# Patient Record
Sex: MALE | Race: WHITE | NOT HISPANIC OR LATINO | URBAN - NONMETROPOLITAN AREA
[De-identification: names, ages, dates, MRNs, and addresses within clinical notes are randomized per-mention and may not be internally consistent; named-entity substitution may affect disease eponyms.]

---

## 2018-11-21 ENCOUNTER — OFFICE VISIT (OUTPATIENT)
Dept: INTERNAL MEDICINE | Facility: CLINIC | Age: 23
End: 2018-11-21

## 2018-11-21 VITALS
SYSTOLIC BLOOD PRESSURE: 120 MMHG | WEIGHT: 215 LBS | DIASTOLIC BLOOD PRESSURE: 74 MMHG | HEIGHT: 74 IN | HEART RATE: 58 BPM | RESPIRATION RATE: 16 BRPM | BODY MASS INDEX: 27.59 KG/M2 | OXYGEN SATURATION: 100 % | TEMPERATURE: 97.2 F

## 2018-11-21 DIAGNOSIS — Z23 NEED FOR HEPATITIS A IMMUNIZATION: ICD-10-CM

## 2018-11-21 DIAGNOSIS — Z00.00 ENCOUNTER FOR ANNUAL PHYSICAL EXAM: Primary | ICD-10-CM

## 2018-11-21 DIAGNOSIS — Z83.3 FAMILY HISTORY OF TYPE II DIABETES MELLITUS: ICD-10-CM

## 2018-11-21 DIAGNOSIS — Z82.49 FAMILY HISTORY OF HYPERTENSION: ICD-10-CM

## 2018-11-21 PROCEDURE — 90471 IMMUNIZATION ADMIN: CPT | Performed by: PHYSICIAN ASSISTANT

## 2018-11-21 PROCEDURE — 99395 PREV VISIT EST AGE 18-39: CPT | Performed by: PHYSICIAN ASSISTANT

## 2018-11-21 PROCEDURE — 90632 HEPA VACCINE ADULT IM: CPT | Performed by: PHYSICIAN ASSISTANT

## 2018-11-21 NOTE — PROGRESS NOTES
Chief Complaint   Patient presents with   • Establish Care   • PHYSICAL       Walter Justin is a 23 y.o. male and is here for a comprehensive physical exam.  The patient reports no problems.  He reports that he needs a physical exam so that he can be fit tested for a respirator.  He is attending a restoration class next week, which requires fit testing.      Do you take any herbs or supplements that were not prescribed by a doctor? no  Are you taking calcium supplements? no  Are you taking aspirin daily? no    Health Habits:  Dental Exam. up to date  Eye Exam. up to date  Exercise: 3 times/week.  Current exercise activities include: walking and weightlifting    Health Maintenance   Topic Date Due   • ANNUAL PHYSICAL  11/22/2019   • TDAP/TD VACCINES (2 - Td) 07/16/2028   • INFLUENZA VACCINE  Completed       PMH, PSH, SocHx, FamHx, Allergies, and Medications: Reviewed and updated in the Visit Navigator.     No Known Allergies  Past Medical History:   Diagnosis Date   • GERD (gastroesophageal reflux disease)      Past Surgical History:   Procedure Laterality Date   • TONSILLECTOMY     • WISDOM TOOTH EXTRACTION       Social History     Socioeconomic History   • Marital status:      Spouse name: Not on file   • Number of children: Not on file   • Years of education: Not on file   • Highest education level: Not on file   Social Needs   • Financial resource strain: Not on file   • Food insecurity - worry: Not on file   • Food insecurity - inability: Not on file   • Transportation needs - medical: Not on file   • Transportation needs - non-medical: Not on file   Occupational History   • Not on file   Tobacco Use   • Smoking status: Never Smoker   • Smokeless tobacco: Never Used   Substance and Sexual Activity   • Alcohol use: Yes     Alcohol/week: 0.6 oz     Types: 1 Cans of beer per week   • Drug use: No   • Sexual activity: Yes     Partners: Female   Other Topics Concern   • Not on file   Social History  "Narrative   • Not on file     Family History   Problem Relation Age of Onset   • Arthritis Mother    • Anxiety disorder Mother    • Diabetes Mother    • Hypertension Mother    • Diabetes Brother        Review of Systems  Review of Systems   Constitutional: Negative for activity change, appetite change, fatigue, fever, unexpected weight gain and unexpected weight loss.   HENT: Negative for congestion and sore throat.    Eyes: Negative for visual disturbance.   Respiratory: Negative for shortness of breath.    Cardiovascular: Negative for chest pain.   Gastrointestinal: Negative for abdominal pain, diarrhea, nausea and vomiting.   Genitourinary: Negative for dysuria.   Musculoskeletal: Negative for arthralgias and myalgias.   Neurological: Negative for syncope, light-headedness and confusion.   Psychiatric/Behavioral: Negative for behavioral problems, sleep disturbance and depressed mood.        Vitals:    11/21/18 1001   BP: 120/74   Pulse: 58   Resp: 16   Temp: 97.2 °F (36.2 °C)   SpO2: 100%       Objective   /74 (BP Location: Left arm, Patient Position: Sitting, Cuff Size: Adult)   Pulse 58   Temp 97.2 °F (36.2 °C)   Resp 16   Ht 188 cm (74\")   Wt 97.5 kg (215 lb)   SpO2 100%   BMI 27.60 kg/m²     Physical Exam   Constitutional: He is oriented to person, place, and time. He appears well-developed and well-nourished. No distress.   HENT:   Head: Normocephalic and atraumatic.   Right Ear: Hearing, tympanic membrane, external ear and ear canal normal.   Left Ear: Hearing, tympanic membrane, external ear and ear canal normal.   Nose: No rhinorrhea or congestion.   Mouth/Throat: Oropharynx is clear and moist.   Neck: Neck supple.   Cardiovascular: Normal rate, regular rhythm, normal heart sounds and intact distal pulses. Exam reveals no gallop and no friction rub.   No murmur heard.  Pulmonary/Chest: Effort normal and breath sounds normal. No respiratory distress. He has no wheezes. He has no rales. "   Abdominal: Soft. Bowel sounds are normal.   Musculoskeletal: Normal range of motion. He exhibits no edema.   Neurological: He is alert and oriented to person, place, and time.   Skin: Skin is warm and dry. Capillary refill takes less than 2 seconds. He is not diaphoretic.   Psychiatric: He has a normal mood and affect. His behavior is normal.   Nursing note and vitals reviewed.        Assessment/Plan   1. Healthy male exam.  Letter provided for him to proceed with FIT testing.   2. Patient Counseling: Including but not Limited to the following, when appropriate:  --Nutrition: Stressed importance of moderation in sodium/caffeine intake, saturated fat and cholesterol, caloric balance, sufficient intake of fresh fruits, vegetables, fiber, calcium, iron.  Encouraged patient to incorporate fresh fruits and vegetables into his diet.  --Exercise: Stressed the importance of regular exercise.   --Substance Abuse: Discussed cessation/primary prevention of tobacco, alcohol, or other drug use; driving or other dangerous activities under the influence; availability of treatment for abuse, as indicated based on social history.  Denies any drug use. Does not smoke.   --Sexuality: Discussed sexually transmitted diseases, partner selection, use of condoms, avoidance of unintended pregnancy  and contraceptive alternatives.   --Injury prevention: Discussed safety belts, safety helmets, smoke detector, smoking near bedding or upholstery.   --Dental health: Discussed importance of regular tooth brushing, flossing, and dental visits.  --Immunizations reviewed.  Hepatitis A vaccination given today in clinic.  3. Discussed the patient's BMI with him.  Patient's Body mass index is 27.6 kg/m². BMI is within normal parameters. No follow-up required.  4. Return for Annual.   5. Reviewed Immunization history.  Will obtain hepatitis A vaccination today.     Walter was seen today for establish care and physical.    Diagnoses and all orders  for this visit:    Encounter for annual physical exam  -     CBC & Differential  -     Comprehensive Metabolic Panel    Family history of type II diabetes mellitus  -     CBC & Differential  -     Comprehensive Metabolic Panel    Family history of hypertension  -     CBC & Differential  -     Comprehensive Metabolic Panel    Need for hepatitis A immunization  -     Hepatitis A Vaccine Adult IM         ALEX MoralesC

## 2019-09-11 RX ORDER — AMOXICILLIN 875 MG/1
875 TABLET, COATED ORAL 2 TIMES DAILY
Qty: 20 TABLET | Refills: 0 | Status: SHIPPED | OUTPATIENT
Start: 2019-09-11 | End: 2019-09-21

## 2019-11-08 ENCOUNTER — FLU SHOT (OUTPATIENT)
Dept: INTERNAL MEDICINE | Facility: CLINIC | Age: 24
End: 2019-11-08

## 2019-11-08 DIAGNOSIS — Z23 NEED FOR HEPATITIS A IMMUNIZATION: ICD-10-CM

## 2019-11-08 DIAGNOSIS — Z23 NEED FOR INFLUENZA VACCINATION: Primary | ICD-10-CM

## 2019-11-08 PROCEDURE — 90674 CCIIV4 VAC NO PRSV 0.5 ML IM: CPT | Performed by: FAMILY MEDICINE

## 2019-11-08 PROCEDURE — 90632 HEPA VACCINE ADULT IM: CPT | Performed by: FAMILY MEDICINE

## 2019-11-08 PROCEDURE — 90472 IMMUNIZATION ADMIN EACH ADD: CPT | Performed by: FAMILY MEDICINE

## 2019-11-08 PROCEDURE — 90471 IMMUNIZATION ADMIN: CPT | Performed by: FAMILY MEDICINE

## 2020-12-08 ENCOUNTER — OFFICE VISIT (OUTPATIENT)
Dept: INTERNAL MEDICINE | Facility: CLINIC | Age: 25
End: 2020-12-08

## 2020-12-08 ENCOUNTER — CLINICAL SUPPORT (OUTPATIENT)
Dept: INTERNAL MEDICINE | Facility: CLINIC | Age: 25
End: 2020-12-08

## 2020-12-08 DIAGNOSIS — R68.89 FLU-LIKE SYMPTOMS: ICD-10-CM

## 2020-12-08 DIAGNOSIS — R51.9 ACUTE NONINTRACTABLE HEADACHE, UNSPECIFIED HEADACHE TYPE: ICD-10-CM

## 2020-12-08 DIAGNOSIS — R23.3 PETECHIAE OF PALATE: ICD-10-CM

## 2020-12-08 DIAGNOSIS — R50.9 FEVER AND CHILLS: Primary | ICD-10-CM

## 2020-12-08 DIAGNOSIS — R52 BODY ACHES: ICD-10-CM

## 2020-12-08 LAB
EXPIRATION DATE: NORMAL
EXPIRATION DATE: NORMAL
FLUAV AG NPH QL: NEGATIVE
FLUBV AG NPH QL: NEGATIVE
INTERNAL CONTROL: NORMAL
INTERNAL CONTROL: NORMAL
Lab: 6636
Lab: NORMAL
S PYO AG THROAT QL: NEGATIVE

## 2020-12-08 PROCEDURE — 87880 STREP A ASSAY W/OPTIC: CPT | Performed by: FAMILY MEDICINE

## 2020-12-08 PROCEDURE — 87804 INFLUENZA ASSAY W/OPTIC: CPT | Performed by: FAMILY MEDICINE

## 2020-12-08 PROCEDURE — 99214 OFFICE O/P EST MOD 30 MIN: CPT | Performed by: FAMILY MEDICINE

## 2020-12-08 NOTE — PROGRESS NOTES
Subjective    Walter Justin is a 25 y.o. male with:  Chief Complaint   Patient presents with   • Flu Symptoms     Temperature at 101 today. Body aches with chills, headache, and fatigue. Started this morning.        History per MA reviewed.    Jeanine yesterday  Went to work and had to come home at 10  Symptom hit very suddenly  Body hurts all over  Bad headache  Freezing but has temp of 101      No loss of taste or smell  No shortness of breath  No sore throat    The  of a coworker tested positive for covid-19 but Mason hasn't been in the office (works out of office at sites)         The following portions of the patient's history were reviewed and updated as appropriate: allergies, current medications, past family history, past medical history, past social history, past surgical history and problem list.    Review of Systems   Constitutional: Positive for activity change, chills and fever.   HENT: Negative for sore throat.    Respiratory: Negative for shortness of breath.    Musculoskeletal: Positive for arthralgias and myalgias.   Neurological: Positive for headache.       There were no vitals taken for this visit.      Objective    ENT: per wife patient does not have exudate to OP but does have petechiae  Neck: Voice is within normal limits  Neuro: A&O x 3  Psych: Mood and affect appropriate.     Office Visit on 12/08/2020   Component Date Value Ref Range Status   • Rapid Influenza A Ag 12/08/2020 Negative  Negative Final   • Rapid Influenza B Ag 12/08/2020 Negative  Negative Final   • Internal Control 12/08/2020 Passed  Passed Final   • Lot Number 12/08/2020 [id number]   Final    [id number]   • Expiration Date 12/08/2020 1,[id number]   Final   • Rapid Strep A Screen 12/08/2020 Negative  Negative, VALID, INVALID, Not Performed Final   • Internal Control 12/08/2020 Passed  Passed Final   • Lot Number 12/08/2020 [id number]   Final   • Expiration Date 12/08/2020 [id number]   Final         Assessment/Plan     Problem List  Items Addressed This Visit     None      Visit Diagnoses     Fever and chills    -  Primary    Relevant Orders    POC Influenza A / B (Completed)    POC Rapid Strep A (Completed)    Body aches        Relevant Orders    POC Influenza A / B (Completed)    Acute nonintractable headache, unspecified headache type        Relevant Orders    POC Influenza A / B (Completed)    POC Rapid Strep A (Completed)    Flu-like symptoms        Relevant Medications    oseltamivir (Tamiflu) 75 MG capsule    Other Relevant Orders    POC Influenza A / B (Completed)    Petechiae of palate        Relevant Orders    POC Rapid Strep A (Completed)          · This visit has been rescheduled as a phone visit to comply with patient safety concerns in accordance with CDC recommendations. Total time of discussion was 10 minutes.  · High likelihood of false positive influenza test as flu b is in community. Treating empirically.    Marita Calvin MD

## 2020-12-08 NOTE — PROGRESS NOTES
You have chosen to receive care through a telephone visit. Do you consent to use a telephone visit for your medical care today? Yes    On this telephone visit is Dr. Marixa Caputo CMA, Mason Justin, and his wife Tam Justin.

## 2020-12-09 RX ORDER — OSELTAMIVIR PHOSPHATE 75 MG/1
75 CAPSULE ORAL 2 TIMES DAILY
Qty: 10 CAPSULE | Refills: 0 | Status: SHIPPED | OUTPATIENT
Start: 2020-12-09 | End: 2020-12-14